# Patient Record
(demographics unavailable — no encounter records)

---

## 2025-07-10 NOTE — CARDIOLOGY SUMMARY
[de-identified] : 7 10 2025: Sinus Rhythm  -Right bundle branch block with left axis -bifascicular block ABNORMAL

## 2025-07-10 NOTE — ASSESSMENT
[FreeTextEntry1] : EKG 3/25/2024- Sinus Bradycardia  -Right bundle branch block with left axis -bifascicular block. ABNORMAL   Assessment: 1.  Hyperlipidemia-patient does not want to take any medications for controlling hyperlipidemia 2.  Hypertension 3.  Abnormal EKG  Recommendations: Given her age, hyperlipidemia and hypertension she was strongly recommended to consider doing a stress test, echocardiogram and a CT heart calcium score.  Patient states she will think about it.  For now she does not want to do any test. REFUSED ECHO/STRESS TEST/CT HEART CALCIUM SOCRE.  Patient was recommended to for follow-up in 6 months

## 2025-07-10 NOTE — HISTORY OF PRESENT ILLNESS
[FreeTextEntry4] : MS. MORENO IS A PLEASANT 82 YO PRESENTING FOR MEDICAL CLEARANCE FOR UPCOMING CATARACT SURGERY.  FEELING WELL TODAY.  NO HISTORY OF MI, STROKE OR SEIZURE.  NO PERSONAL OR FAMILY HISTORY OF BLOOD DISORDERS.  DENIES EASY BLEEDING OR BRUISING.  IS ABLE TO WALK MULTIPLE BLOCKS AND GO UP MULTIPLE FLIGHTS OF STAIRS WITHOUT DIFFICULTY.   HAS HAD NO HEADACHE, DIZZINESS, CHEST PAIN, SHORTNESS OF BREATH, GI OR URINARY COMPLAINTS.  NO OTHER COMPLAINTS TODAY. HISTORY OF HYPERTENSION AND HYPERCHOLESTEROLEMIA.  IS ON LOSARTAN FOR BLOOD PRESSURE CONTROL.  ALSO HISTORY OF RBBB ON EKG.  FOLLOWS WITH CARDIOLOGY. [FreeTextEntry1] : WELLNESS EXAM [de-identified] :  DERMATOLOGY: SWITCHING TO DR. GOMEZ FOR SCREENING  HISTORY OF HYPERTENSION.  BLOOD PRESSURE USUALLY WELL CONTROLLED AT HOME.  HIGHEST IN 'S AT HOME.

## 2025-07-10 NOTE — HEALTH RISK ASSESSMENT
[Very Good] : ~his/her~  mood as very good [No] : In the past 12 months have you used drugs other than those required for medical reasons? No [No falls in past year] : Patient reported no falls in the past year [Little interest or pleasure doing things] : 1) Little interest or pleasure doing things [Feeling down, depressed, or hopeless] : 2) Feeling down, depressed, or hopeless [0] : 2) Feeling down, depressed, or hopeless: Not at all (0) [PHQ-2 Negative - No further assessment needed] : PHQ-2 Negative - No further assessment needed [Never] : Never [NO] : No [Patient declined mammogram] : Patient declined mammogram [Patient declined PAP Smear] : Patient declined PAP Smear [Patient declined bone density test] : Patient declined bone density test [Patient declined colonoscopy] : Patient declined colonoscopy [HIV test declined] : HIV test declined [Hepatitis C test declined] : Hepatitis C test declined [None] : None [Alone] : lives alone [Retired] : retired [High School] : high school [] :  [# Of Children ___] : has [unfilled] children [Feels Safe at Home] : Feels safe at home [Fully functional (bathing, dressing, toileting, transferring, walking, feeding)] : Fully functional (bathing, dressing, toileting, transferring, walking, feeding) [Fully functional (using the telephone, shopping, preparing meals, housekeeping, doing laundry, using] : Fully functional and needs no help or supervision to perform IADLs (using the telephone, shopping, preparing meals, housekeeping, doing laundry, using transportation, managing medications and managing finances) [Independent] : managing finances [Reports normal functional visual acuity (ie: able to read med bottle)] : Reports normal functional visual acuity [Smoke Detector] : smoke detector [Carbon Monoxide Detector] : carbon monoxide detector [Safety elements used in home] : safety elements used in home [Seat Belt] :  uses seat belt [Sunscreen] : uses sunscreen [With Patient/Caregiver] : , with patient/caregiver [de-identified] : REMAINS ACTIVE.  SOME WALKING DAILY [de-identified] : OVERALL EATING HEALTHY [[id number]] : 0 [Change in mental status noted] : No change in mental status noted [Language] : denies difficulty with language [Learning/Retaining New Information] : denies difficulty learning/retaining new information [Handling Complex Tasks] : denies difficulty handling complex tasks [Sexually Active] : not sexually active [High Risk Behavior] : no high risk behavior [Reports changes in hearing] : Reports no changes in hearing [Reports changes in vision] : Reports no changes in vision [Travel to Developing Areas] : does not  travel to developing areas [TB Exposure] : is not being exposed to tuberculosis [Caregiver Concerns] : does not have caregiver concerns [de-identified] : GLASSES FOR DISTANCE AND READING [de-identified] : TO SEE DENTIST [AdvancecareDate] : 07/25

## 2025-07-10 NOTE — HEALTH RISK ASSESSMENT
[Very Good] : ~his/her~  mood as very good [No] : In the past 12 months have you used drugs other than those required for medical reasons? No [No falls in past year] : Patient reported no falls in the past year [Little interest or pleasure doing things] : 1) Little interest or pleasure doing things [Feeling down, depressed, or hopeless] : 2) Feeling down, depressed, or hopeless [0] : 2) Feeling down, depressed, or hopeless: Not at all (0) [PHQ-2 Negative - No further assessment needed] : PHQ-2 Negative - No further assessment needed [Never] : Never [NO] : No [Patient declined mammogram] : Patient declined mammogram [Patient declined PAP Smear] : Patient declined PAP Smear [Patient declined bone density test] : Patient declined bone density test [Patient declined colonoscopy] : Patient declined colonoscopy [HIV test declined] : HIV test declined [Hepatitis C test declined] : Hepatitis C test declined [None] : None [Alone] : lives alone [Retired] : retired [High School] : high school [] :  [# Of Children ___] : has [unfilled] children [Feels Safe at Home] : Feels safe at home [Fully functional (bathing, dressing, toileting, transferring, walking, feeding)] : Fully functional (bathing, dressing, toileting, transferring, walking, feeding) [Fully functional (using the telephone, shopping, preparing meals, housekeeping, doing laundry, using] : Fully functional and needs no help or supervision to perform IADLs (using the telephone, shopping, preparing meals, housekeeping, doing laundry, using transportation, managing medications and managing finances) [Independent] : managing finances [Reports normal functional visual acuity (ie: able to read med bottle)] : Reports normal functional visual acuity [Smoke Detector] : smoke detector [Carbon Monoxide Detector] : carbon monoxide detector [Safety elements used in home] : safety elements used in home [Seat Belt] :  uses seat belt [Sunscreen] : uses sunscreen [With Patient/Caregiver] : , with patient/caregiver [de-identified] : REMAINS ACTIVE.  SOME WALKING DAILY [de-identified] : OVERALL EATING HEALTHY [[id number]] : 0 [Change in mental status noted] : No change in mental status noted [Language] : denies difficulty with language [Learning/Retaining New Information] : denies difficulty learning/retaining new information [Handling Complex Tasks] : denies difficulty handling complex tasks [Sexually Active] : not sexually active [High Risk Behavior] : no high risk behavior [Reports changes in hearing] : Reports no changes in hearing [Reports changes in vision] : Reports no changes in vision [Travel to Developing Areas] : does not  travel to developing areas [TB Exposure] : is not being exposed to tuberculosis [Caregiver Concerns] : does not have caregiver concerns [de-identified] : GLASSES FOR DISTANCE AND READING [de-identified] : TO SEE DENTIST [AdvancecareDate] : 07/25

## 2025-07-10 NOTE — DISCUSSION/SUMMARY
[Patient] : the patient [Risks] : risks [Benefits] : benefits [Alternatives] : alternatives [With Me] : with me [___ Year(s)] : in [unfilled] year(s) [FreeTextEntry1] :  elderly 81-year-old  female with a history of hyperlipidemia and hypertension presents for cardiac evaluation. and Pre-operative cardiovascular risk evaluation and management with RBBB and abnormal ECG  1) Pre-operative cardiovascular risk evaluation and management  : ABnormal ECG :  intermediate risk factors for coronary artery disease .echocardiogram with contrast if needed.   Nuclear stress test with IV technetium radiotracer.   if no signifificant ischemia and normal LVE>F  proceed for surgery  2) Abnormal ECG adn RBBB:  as above  3) hypertension : white coat syndrome. : 24 hR BP monitor.  [EKG obtained to assist in diagnosis and management of assessed problem(s)] : EKG obtained to assist in diagnosis and management of assessed problem(s)

## 2025-07-10 NOTE — HISTORY OF PRESENT ILLNESS
[FreeTextEntry4] : MS. MORENO IS A PLEASANT 84 YO PRESENTING FOR MEDICAL CLEARANCE FOR UPCOMING CATARACT SURGERY.  FEELING WELL TODAY.  NO HISTORY OF MI, STROKE OR SEIZURE.  NO PERSONAL OR FAMILY HISTORY OF BLOOD DISORDERS.  DENIES EASY BLEEDING OR BRUISING.  IS ABLE TO WALK MULTIPLE BLOCKS AND GO UP MULTIPLE FLIGHTS OF STAIRS WITHOUT DIFFICULTY.   HAS HAD NO HEADACHE, DIZZINESS, CHEST PAIN, SHORTNESS OF BREATH, GI OR URINARY COMPLAINTS.  NO OTHER COMPLAINTS TODAY. HISTORY OF HYPERTENSION AND HYPERCHOLESTEROLEMIA.  IS ON LOSARTAN FOR BLOOD PRESSURE CONTROL.  ALSO HISTORY OF RBBB ON EKG.  FOLLOWS WITH CARDIOLOGY. [FreeTextEntry1] : WELLNESS EXAM [de-identified] :  DERMATOLOGY: SWITCHING TO DR. GOMEZ FOR SCREENING  HISTORY OF HYPERTENSION.  BLOOD PRESSURE USUALLY WELL CONTROLLED AT HOME.  HIGHEST IN 'S AT HOME.

## 2025-07-10 NOTE — HISTORY OF PRESENT ILLNESS
[FreeTextEntry1] : reason  : Pre-operative cardiovascular risk evaluation and management  for cataract surgery   HPI for today:  7 10 2025:   she is going for cataract surgery both 2 weesk in a row.  n ches tpain . no dyspnea. no diziznes.s no palptiations.  no syncope.  she is compliant with meds.   she has white coat syndrome.  she does take BP at home,   I independently reviewed the ECG,  PCP/medicine note, specialist consult/progress note, radiology testing ordered by another physician and these are my findings: dyslipidemia she has high cholester.  b12 and CMP was unremarkable      old note: HPI: Patient is an elderly 81-year-old  female with a history of hyperlipidemia and hypertension presents for cardiac evaluation.  Patient denies any exertional chest pain dyspnea palpitations.  Patient is physically active.  Patient denies orthopnea PND or leg edema.     PMH: HLD. HTN. No DM. No prior CAD or MI.  Social History: Nonsmoker. Denies alcohol or substance abuse.  Family history: Noncontributory.        .